# Patient Record
Sex: MALE | Race: WHITE
[De-identification: names, ages, dates, MRNs, and addresses within clinical notes are randomized per-mention and may not be internally consistent; named-entity substitution may affect disease eponyms.]

---

## 2018-06-13 ENCOUNTER — HOSPITAL ENCOUNTER (OUTPATIENT)
Dept: HOSPITAL 53 - M SMT | Age: 43
End: 2018-06-13
Attending: FAMILY MEDICINE
Payer: COMMERCIAL

## 2018-06-13 DIAGNOSIS — R53.83: ICD-10-CM

## 2018-06-13 DIAGNOSIS — E29.1: Primary | ICD-10-CM

## 2018-06-13 LAB
ALBUMIN/GLOBULIN RATIO: 1.18 (ref 1–1.93)
ALBUMIN: 3.9 GM/DL (ref 3.2–5.2)
ALKALINE PHOSPHATASE: 79 U/L (ref 45–117)
ALT SERPL W P-5'-P-CCNC: 25 U/L (ref 12–78)
ANION GAP: 11 MEQ/L (ref 8–16)
AST SERPL-CCNC: 13 U/L (ref 7–37)
BASO #: 0.1 10^3/UL (ref 0–0.2)
BASO %: 1.2 % (ref 0–1)
BILIRUBIN,TOTAL: 0.3 MG/DL (ref 0.2–1)
BLOOD UREA NITROGEN: 22 MG/DL (ref 7–18)
CALCIUM LEVEL: 8.8 MG/DL (ref 8.5–10.1)
CARBON DIOXIDE LEVEL: 25 MEQ/L (ref 21–32)
CHLORIDE LEVEL: 106 MEQ/L (ref 98–107)
CREATININE FOR GFR: 1.14 MG/DL (ref 0.7–1.3)
EOS #: 0.3 10^3/UL (ref 0–0.5)
EOSINOPHIL NFR BLD AUTO: 3.4 % (ref 0–3)
ESTRADIOL: 28.8 PG/ML (ref ?–39.8)
FREE T3: 3.3 PG/ML (ref 2.2–4)
FREE T4: 1.29 NG/DL (ref 0.76–1.46)
GFR SERPL CREATININE-BSD FRML MDRD: > 60 ML/MIN/{1.73_M2} (ref 60–?)
GLUCOSE, FASTING: 97 MG/DL (ref 70–100)
HEMATOCRIT: 43.7 % (ref 42–52)
HEMOGLOBIN: 15.3 G/DL (ref 13.5–17.5)
IMMATURE GRANULOCYTE %: 1 % (ref 0–3)
LYMPH #: 2.3 10^3/UL (ref 1.5–4.5)
LYMPH %: 31 % (ref 24–44)
MEAN CORPUSCULAR HEMOGLOBIN: 30.5 PG (ref 27–33)
MEAN CORPUSCULAR HGB CONC: 35 G/DL (ref 32–36.5)
MEAN CORPUSCULAR VOLUME: 87.1 FL (ref 80–96)
MONO #: 0.7 10^3/UL (ref 0–0.8)
MONO %: 9.2 % (ref 0–5)
NEUTROPHILS #: 4 10^3/UL (ref 1.8–7.7)
NEUTROPHILS %: 54.2 % (ref 36–66)
NRBC BLD AUTO-RTO: 0 % (ref 0–0)
PLATELET COUNT, AUTOMATED: 290 10^3/UL (ref 150–450)
POTASSIUM SERUM: 4.5 MEQ/L (ref 3.5–5.1)
RED BLOOD COUNT: 5.02 10^6/UL (ref 4.3–6.1)
RED CELL DISTRIBUTION WIDTH: 12 % (ref 11.5–14.5)
SODIUM LEVEL: 142 MEQ/L (ref 136–145)
THYROID STIMULATING HORMONE: 1.57 UIU/ML (ref 0.36–3.74)
TOTAL PROTEIN: 7.2 GM/DL (ref 6.4–8.2)
WHITE BLOOD COUNT: 7.3 10^3/UL (ref 4–10)

## 2018-06-13 PROCEDURE — 84403 ASSAY OF TOTAL TESTOSTERONE: CPT

## 2018-06-15 LAB
PSA SERPL-MCNC: 1.3 NG/ML (ref 0–4)
TESTOST FREE SERPL-MCNC: 10.2 PG/ML (ref 6.8–21.5)
TESTOST SERPL-MCNC: 533 NG/DL (ref 264–916)

## 2019-08-22 ENCOUNTER — HOSPITAL ENCOUNTER (OUTPATIENT)
Dept: HOSPITAL 53 - M LAB | Age: 44
End: 2019-08-22
Attending: FAMILY MEDICINE
Payer: COMMERCIAL

## 2019-08-22 DIAGNOSIS — R91.8: ICD-10-CM

## 2019-08-22 DIAGNOSIS — E03.9: ICD-10-CM

## 2019-08-22 DIAGNOSIS — R53.83: Primary | ICD-10-CM

## 2019-08-22 DIAGNOSIS — I10: ICD-10-CM

## 2019-08-22 LAB
ALBUMIN SERPL BCG-MCNC: 3.7 GM/DL (ref 3.2–5.2)
ALT SERPL W P-5'-P-CCNC: 24 U/L (ref 12–78)
BILIRUB SERPL-MCNC: 0.3 MG/DL (ref 0.2–1)
BUN SERPL-MCNC: 19 MG/DL (ref 7–18)
CALCIUM SERPL-MCNC: 9.4 MG/DL (ref 8.5–10.1)
CHLORIDE SERPL-SCNC: 106 MEQ/L (ref 98–107)
CHOLEST SERPL-MCNC: 182 MG/DL (ref ?–200)
CHOLEST/HDLC SERPL: 4.14 {RATIO} (ref ?–5)
CO2 SERPL-SCNC: 29 MEQ/L (ref 21–32)
CREAT SERPL-MCNC: 1.27 MG/DL (ref 0.7–1.3)
EST. AVERAGE GLUCOSE BLD GHB EST-MCNC: 120 MG/DL (ref 60–110)
GFR SERPL CREATININE-BSD FRML MDRD: > 60 ML/MIN/{1.73_M2} (ref 60–?)
GLUCOSE SERPL-MCNC: 95 MG/DL (ref 70–100)
HCT VFR BLD AUTO: 45 % (ref 42–52)
HDLC SERPL-MCNC: 44 MG/DL (ref 40–?)
HGB BLD-MCNC: 15.6 G/DL (ref 13.5–17.5)
LDLC SERPL CALC-MCNC: 109 MG/DL (ref ?–100)
MCH RBC QN AUTO: 32 PG (ref 27–33)
MCHC RBC AUTO-ENTMCNC: 34.7 G/DL (ref 32–36.5)
MCV RBC AUTO: 92.2 FL (ref 80–96)
NONHDLC SERPL-MCNC: 138 MG/DL
PLATELET # BLD AUTO: 290 10^3/UL (ref 150–450)
POTASSIUM SERPL-SCNC: 4.9 MEQ/L (ref 3.5–5.1)
PROT SERPL-MCNC: 6.9 GM/DL (ref 6.4–8.2)
RBC # BLD AUTO: 4.88 10^6/UL (ref 4.3–6.1)
SODIUM SERPL-SCNC: 141 MEQ/L (ref 136–145)
T3 SERPL-MCNC: 119.4 NG/DL (ref 60–181)
T4 SERPL-MCNC: 8.9 UG/DL (ref 4.5–12)
TESTOST SERPL-MCNC: 291 NG/DL (ref 241–827)
TRIGL SERPL-MCNC: 147 MG/DL (ref ?–150)
TSH SERPL DL<=0.005 MIU/L-ACNC: 1.68 UIU/ML (ref 0.36–3.74)
WBC # BLD AUTO: 7.8 10^3/UL (ref 4–10)

## 2019-08-22 PROCEDURE — 80061 LIPID PANEL: CPT

## 2019-08-22 PROCEDURE — 80053 COMPREHEN METABOLIC PANEL: CPT

## 2019-08-22 PROCEDURE — 84480 ASSAY TRIIODOTHYRONINE (T3): CPT

## 2019-08-22 PROCEDURE — 84403 ASSAY OF TOTAL TESTOSTERONE: CPT

## 2019-08-22 PROCEDURE — 71046 X-RAY EXAM CHEST 2 VIEWS: CPT

## 2019-08-22 PROCEDURE — 93005 ELECTROCARDIOGRAM TRACING: CPT

## 2019-08-22 PROCEDURE — 83036 HEMOGLOBIN GLYCOSYLATED A1C: CPT

## 2019-08-22 PROCEDURE — 36415 COLL VENOUS BLD VENIPUNCTURE: CPT

## 2019-08-22 PROCEDURE — 85027 COMPLETE CBC AUTOMATED: CPT

## 2019-08-22 PROCEDURE — 84443 ASSAY THYROID STIM HORMONE: CPT

## 2019-08-22 PROCEDURE — 84436 ASSAY OF TOTAL THYROXINE: CPT

## 2019-08-22 NOTE — REP
REASON:  Hypertension and fatigue.

 

COMPARISON:  None.

 

There is a subtle potential slightly irregular nodular density in the left upper

lobe. This measures approximately a centimeter, but obviously magnified by

technique. The lungs are otherwise clear, and the heart is not enlarged. The

pleural angles are sharp. The osseous structures are within normal limits.

 

IMPRESSION:

 

Potential left upper lobe lung nodule, as described above. Since there are no

prior examinations for comparison, contrast-enhanced CT examination of the chest

is recommended.

 

 

Electronically Signed by

Jad Lang DO 08/22/2019 10:56 A

## 2019-08-23 NOTE — ECGEPIP
[facility]

                                       

                                       Test Date:    2019

Pat Name:     BABITA SAUNDERS              Department:   

Patient ID:   [id number]                 Room:         -

Gender:       Male                     Technician:   ANGEL

:          1975               Requested By: Bereket Avendaño

Order Number: [id number]     Reading MD:   Sheng James

                                 Measurements

Intervals                              Axis          

Rate:         65                       P:            44

KS:           149                      QRS:          8

QRSD:         94                       T:            52

QT:           409                                    

QTc:          426                                    

                           Interpretive Statements

SINUS RHYTHM

NO PRIOR

Electronically Signed on 2019 9:12:45 EDT by Sheng James

## 2019-09-05 ENCOUNTER — HOSPITAL ENCOUNTER (OUTPATIENT)
Dept: HOSPITAL 53 - M RAD | Age: 44
End: 2019-09-05
Attending: FAMILY MEDICINE
Payer: COMMERCIAL

## 2019-09-05 DIAGNOSIS — J84.10: ICD-10-CM

## 2019-09-05 DIAGNOSIS — R91.1: Primary | ICD-10-CM

## 2019-09-05 PROCEDURE — 71260 CT THORAX DX C+: CPT

## 2019-09-05 NOTE — REP
Clinical:  Abnormal findings on recent chest x-ray.

 

Comparison:  Chest x-ray dated 08/22/2019

 

Technique:

Axial contrast enhanced images from the thoracic inlet to the upper abdomen with

coronal and sagittal re-formations using 100 ml Isovue 370 intravenous contrast

material.

 

Findings:

Left upper lobe nodule identified on recent chest x-ray corresponds to a

calcified granuloma.  Small focal area of subpleural scarring in the posterior

right upper lung zone (image [id number]) is also identified and likely chronic.  No

acute consolidation, significant nodule or mass lesion.  No effusion.  No

pneumothorax.  Tracheobronchial tree is patent.  No adenopathy.  The mediastinum

demonstrates normal thoracic aorta and heart/pericardium.  Surrounding

musculoskeletal structures are intact.  Limited upper abdomen demonstrates normal

bilateral adrenal glands.

 

Impression:

1.  Suspicious lesion on x-ray corresponds to chronic calcified left upper lobe

granuloma.

2.  No significant mediastinal or pleuroparenchymal process appreciated.

 

 

Electronically Signed by

Luis A Grissom MD 09/05/2019 05:00 P

## 2019-12-22 ENCOUNTER — HOSPITAL ENCOUNTER (OUTPATIENT)
Dept: HOSPITAL 53 - M LAB | Age: 44
End: 2019-12-22
Attending: FAMILY MEDICINE
Payer: COMMERCIAL

## 2019-12-22 DIAGNOSIS — E03.9: ICD-10-CM

## 2019-12-22 DIAGNOSIS — R53.83: ICD-10-CM

## 2019-12-22 DIAGNOSIS — I10: Primary | ICD-10-CM

## 2019-12-22 LAB
ALBUMIN SERPL BCG-MCNC: 3.6 GM/DL (ref 3.2–5.2)
ALT SERPL W P-5'-P-CCNC: 22 U/L (ref 12–78)
BILIRUB SERPL-MCNC: 0.4 MG/DL (ref 0.2–1)
BUN SERPL-MCNC: 20 MG/DL (ref 7–18)
CALCIUM SERPL-MCNC: 9.1 MG/DL (ref 8.5–10.1)
CHLORIDE SERPL-SCNC: 101 MEQ/L (ref 98–107)
CHOLEST SERPL-MCNC: 172 MG/DL (ref ?–200)
CHOLEST/HDLC SERPL: 4.3 {RATIO} (ref ?–5)
CO2 SERPL-SCNC: 30 MEQ/L (ref 21–32)
CREAT SERPL-MCNC: 1.65 MG/DL (ref 0.7–1.3)
EST. AVERAGE GLUCOSE BLD GHB EST-MCNC: 105 MG/DL (ref 60–110)
GFR SERPL CREATININE-BSD FRML MDRD: 48.5 ML/MIN/{1.73_M2} (ref 60–?)
GLUCOSE SERPL-MCNC: 89 MG/DL (ref 70–100)
HCT VFR BLD AUTO: 53.7 % (ref 42–52)
HDLC SERPL-MCNC: 40 MG/DL (ref 40–?)
HGB BLD-MCNC: 17.6 G/DL (ref 13.5–17.5)
LDLC SERPL CALC-MCNC: 112 MG/DL (ref ?–100)
MCH RBC QN AUTO: 29.8 PG (ref 27–33)
MCHC RBC AUTO-ENTMCNC: 32.8 G/DL (ref 32–36.5)
MCV RBC AUTO: 90.9 FL (ref 80–96)
NONHDLC SERPL-MCNC: 132 MG/DL
PLATELET # BLD AUTO: 308 10^3/UL (ref 150–450)
POTASSIUM SERPL-SCNC: 4.9 MEQ/L (ref 3.5–5.1)
PROT SERPL-MCNC: 7.2 GM/DL (ref 6.4–8.2)
PSA SERPL-MCNC: 1.67 NG/ML (ref ?–4)
RBC # BLD AUTO: 5.91 10^6/UL (ref 4.3–6.1)
SODIUM SERPL-SCNC: 137 MEQ/L (ref 136–145)
T4 SERPL-MCNC: 9.2 UG/DL (ref 4.5–12)
TRIGL SERPL-MCNC: 101 MG/DL (ref ?–150)
TSH SERPL DL<=0.005 MIU/L-ACNC: 1.4 UIU/ML (ref 0.36–3.74)
WBC # BLD AUTO: 8.6 10^3/UL (ref 4–10)

## 2019-12-23 LAB
25(OH)D3 SERPL-MCNC: 49.8 NG/ML (ref 30–100)
T3 SERPL-MCNC: 137.9 NG/DL (ref 60–181)
TESTOST SERPL-MCNC: 515 NG/DL (ref 241–827)

## 2021-05-21 ENCOUNTER — HOSPITAL ENCOUNTER (OUTPATIENT)
Dept: HOSPITAL 53 - M LAB REF | Age: 46
End: 2021-05-21
Attending: DERMATOLOGY
Payer: COMMERCIAL

## 2021-05-21 DIAGNOSIS — L72.11: Primary | ICD-10-CM

## 2022-10-14 ENCOUNTER — HOSPITAL ENCOUNTER (OUTPATIENT)
Dept: HOSPITAL 53 - M LAB | Age: 47
End: 2022-10-14
Attending: FAMILY MEDICINE
Payer: COMMERCIAL

## 2022-10-14 DIAGNOSIS — D64.9: Primary | ICD-10-CM

## 2022-10-14 DIAGNOSIS — E03.9: ICD-10-CM

## 2022-10-14 DIAGNOSIS — R53.83: ICD-10-CM

## 2022-10-14 LAB
25(OH)D3 SERPL-MCNC: 57.9 NG/ML (ref 30–100)
ALBUMIN SERPL BCG-MCNC: 3.6 GM/DL (ref 3.2–5.2)
ALT SERPL W P-5'-P-CCNC: 27 U/L (ref 12–78)
BILIRUB SERPL-MCNC: 0.6 MG/DL (ref 0.2–1)
BUN SERPL-MCNC: 21 MG/DL (ref 7–18)
CALCIUM SERPL-MCNC: 9.8 MG/DL (ref 8.5–10.1)
CHLORIDE SERPL-SCNC: 100 MEQ/L (ref 98–107)
CHOLEST SERPL-MCNC: 175 MG/DL (ref ?–200)
CHOLEST/HDLC SERPL: 3.98 {RATIO} (ref ?–5)
CO2 SERPL-SCNC: 27 MEQ/L (ref 21–32)
CREAT SERPL-MCNC: 1.41 MG/DL (ref 0.7–1.3)
EST. AVERAGE GLUCOSE BLD GHB EST-MCNC: 117 MG/DL (ref 60–110)
GFR SERPL CREATININE-BSD FRML MDRD: 57.4 ML/MIN/{1.73_M2} (ref 60–?)
GLUCOSE SERPL-MCNC: 102 MG/DL (ref 70–100)
HCT VFR BLD AUTO: 52.3 % (ref 42–52)
HDLC SERPL-MCNC: 44 MG/DL (ref 40–?)
HGB BLD-MCNC: 17.7 G/DL (ref 13.5–17.5)
LDLC SERPL CALC-MCNC: 112 MG/DL (ref ?–100)
MCH RBC QN AUTO: 30.1 PG (ref 27–33)
MCHC RBC AUTO-ENTMCNC: 33.8 G/DL (ref 32–36.5)
MCV RBC AUTO: 88.9 FL (ref 80–96)
NONHDLC SERPL-MCNC: 131 MG/DL
PLATELET # BLD AUTO: 381 10^3/UL (ref 150–450)
POTASSIUM SERPL-SCNC: 4.6 MEQ/L (ref 3.5–5.1)
PROT SERPL-MCNC: 7.6 GM/DL (ref 6.4–8.2)
PSA SERPL-MCNC: 2.84 NG/ML (ref ?–4)
RBC # BLD AUTO: 5.88 10^6/UL (ref 4.3–6.1)
SODIUM SERPL-SCNC: 134 MEQ/L (ref 136–145)
TESTOST SERPL-MCNC: 353 NG/DL (ref 241–827)
TRIGL SERPL-MCNC: 96 MG/DL (ref ?–150)
TSH SERPL DL<=0.005 MIU/L-ACNC: 1.28 UIU/ML (ref 0.36–3.74)
WBC # BLD AUTO: 10.7 10^3/UL (ref 4–10)

## 2024-09-24 ENCOUNTER — HOSPITAL ENCOUNTER (OUTPATIENT)
Dept: HOSPITAL 53 - M LAB REF | Age: 49
End: 2024-09-24
Attending: FAMILY MEDICINE
Payer: COMMERCIAL

## 2024-09-24 DIAGNOSIS — R73.03: ICD-10-CM

## 2024-09-24 DIAGNOSIS — I10: Primary | ICD-10-CM

## 2024-09-24 LAB
ALBUMIN SERPL BCG-MCNC: 3.6 G/DL (ref 3.2–5.2)
ALP SERPL-CCNC: 87 U/L (ref 46–116)
ALT SERPL W P-5'-P-CCNC: 44 U/L (ref 7–40)
AST SERPL-CCNC: 31 U/L (ref ?–34)
BILIRUB SERPL-MCNC: 0.7 MG/DL (ref 0.3–1.2)
BUN SERPL-MCNC: 17 MG/DL (ref 9–23)
CALCIUM SERPL-MCNC: 9.7 MG/DL (ref 8.5–10.1)
CHLORIDE SERPL-SCNC: 104 MMOL/L (ref 98–107)
CHOLEST SERPL-MCNC: 182 MG/DL (ref ?–200)
CHOLEST/HDLC SERPL: 5.09 {RATIO} (ref ?–5)
CO2 SERPL-SCNC: 30 MMOL/L (ref 20–31)
CREAT SERPL-MCNC: 1.28 MG/DL (ref 0.7–1.3)
EST. AVERAGE GLUCOSE BLD GHB EST-MCNC: 114 MG/DL (ref 60–110)
GFR SERPL CREATININE-BSD FRML MDRD: > 60 ML/MIN/{1.73_M2} (ref 60–?)
GLUCOSE SERPL-MCNC: 91 MG/DL (ref 60–100)
HDLC SERPL-MCNC: 35.7 MG/DL (ref 40–?)
LDLC SERPL CALC-MCNC: 128.7 MG/DL (ref ?–100)
NONHDLC SERPL-MCNC: 146.3 MG/DL
POTASSIUM SERPL-SCNC: 4.7 MMOL/L (ref 3.5–5.1)
PROT SERPL-MCNC: 6.6 G/DL (ref 5.7–8.2)
PSA SERPL-MCNC: 2.88 NG/ML (ref ?–4)
SODIUM SERPL-SCNC: 139 MMOL/L (ref 136–145)
TRIGL SERPL-MCNC: 88 MG/DL (ref ?–150)
TSH SERPL DL<=0.005 MIU/L-ACNC: 1.52 UIU/ML (ref 0.55–4.78)